# Patient Record
Sex: MALE | Race: ASIAN | NOT HISPANIC OR LATINO | URBAN - METROPOLITAN AREA
[De-identification: names, ages, dates, MRNs, and addresses within clinical notes are randomized per-mention and may not be internally consistent; named-entity substitution may affect disease eponyms.]

---

## 2019-01-01 ENCOUNTER — INPATIENT (INPATIENT)
Facility: HOSPITAL | Age: 74
LOS: 0 days | DRG: 284 | End: 2019-12-03
Attending: INTERNAL MEDICINE | Admitting: INTERNAL MEDICINE
Payer: MEDICARE

## 2019-01-01 VITALS
TEMPERATURE: 98 F | HEART RATE: 96 BPM | SYSTOLIC BLOOD PRESSURE: 176 MMHG | DIASTOLIC BLOOD PRESSURE: 103 MMHG | RESPIRATION RATE: 25 BRPM | OXYGEN SATURATION: 94 %

## 2019-01-01 VITALS — HEART RATE: 142 BPM

## 2019-01-01 DIAGNOSIS — I21.19 ST ELEVATION (STEMI) MYOCARDIAL INFARCTION INVOLVING OTHER CORONARY ARTERY OF INFERIOR WALL: ICD-10-CM

## 2019-01-01 DIAGNOSIS — E11.9 TYPE 2 DIABETES MELLITUS WITHOUT COMPLICATIONS: ICD-10-CM

## 2019-01-01 DIAGNOSIS — I25.10 ATHEROSCLEROTIC HEART DISEASE OF NATIVE CORONARY ARTERY WITHOUT ANGINA PECTORIS: ICD-10-CM

## 2019-01-01 DIAGNOSIS — Z91.89 OTHER SPECIFIED PERSONAL RISK FACTORS, NOT ELSEWHERE CLASSIFIED: ICD-10-CM

## 2019-01-01 DIAGNOSIS — I10 ESSENTIAL (PRIMARY) HYPERTENSION: ICD-10-CM

## 2019-01-01 DIAGNOSIS — N40.1 BENIGN PROSTATIC HYPERPLASIA WITH LOWER URINARY TRACT SYMPTOMS: ICD-10-CM

## 2019-01-01 DIAGNOSIS — R63.8 OTHER SYMPTOMS AND SIGNS CONCERNING FOOD AND FLUID INTAKE: ICD-10-CM

## 2019-01-01 LAB
ALBUMIN SERPL ELPH-MCNC: 4 G/DL — SIGNIFICANT CHANGE UP (ref 3.3–5)
ALP SERPL-CCNC: 89 U/L — SIGNIFICANT CHANGE UP (ref 40–120)
ALT FLD-CCNC: 37 U/L — SIGNIFICANT CHANGE UP (ref 10–45)
ANION GAP SERPL CALC-SCNC: 16 MMOL/L — SIGNIFICANT CHANGE UP (ref 5–17)
APTT BLD: 163.4 SEC — CRITICAL HIGH (ref 27.5–36.3)
AST SERPL-CCNC: 98 U/L — HIGH (ref 10–40)
BILIRUB SERPL-MCNC: 0.9 MG/DL — SIGNIFICANT CHANGE UP (ref 0.2–1.2)
BUN SERPL-MCNC: 28 MG/DL — HIGH (ref 7–23)
CALCIUM SERPL-MCNC: 10.2 MG/DL — SIGNIFICANT CHANGE UP (ref 8.4–10.5)
CHLORIDE SERPL-SCNC: 96 MMOL/L — SIGNIFICANT CHANGE UP (ref 96–108)
CHOLEST SERPL-MCNC: 224 MG/DL — HIGH (ref 10–199)
CK MB CFR SERPL CALC: 55.3 NG/ML — HIGH (ref 0–6.7)
CK SERPL-CCNC: 1039 U/L — HIGH (ref 30–200)
CO2 SERPL-SCNC: 20 MMOL/L — LOW (ref 22–31)
CREAT SERPL-MCNC: 1.48 MG/DL — HIGH (ref 0.5–1.3)
GAS PNL BLDV: SIGNIFICANT CHANGE UP
GLUCOSE SERPL-MCNC: 370 MG/DL — HIGH (ref 70–99)
HCT VFR BLD CALC: 46.9 % — SIGNIFICANT CHANGE UP (ref 39–50)
HDLC SERPL-MCNC: 57 MG/DL — SIGNIFICANT CHANGE UP
HGB BLD-MCNC: 15 G/DL — SIGNIFICANT CHANGE UP (ref 13–17)
INR BLD: 1.55 — HIGH (ref 0.88–1.16)
LACTATE SERPL-SCNC: 3.2 MMOL/L — HIGH (ref 0.5–2)
LIPID PNL WITH DIRECT LDL SERPL: 146 MG/DL — HIGH
MCHC RBC-ENTMCNC: 27.5 PG — SIGNIFICANT CHANGE UP (ref 27–34)
MCHC RBC-ENTMCNC: 32 GM/DL — SIGNIFICANT CHANGE UP (ref 32–36)
MCV RBC AUTO: 85.9 FL — SIGNIFICANT CHANGE UP (ref 80–100)
NRBC # BLD: 0 /100 WBCS — SIGNIFICANT CHANGE UP (ref 0–0)
PLATELET # BLD AUTO: 244 K/UL — SIGNIFICANT CHANGE UP (ref 150–400)
POTASSIUM SERPL-MCNC: 5.4 MMOL/L — HIGH (ref 3.5–5.3)
POTASSIUM SERPL-SCNC: 5.4 MMOL/L — HIGH (ref 3.5–5.3)
PROT SERPL-MCNC: 7.7 G/DL — SIGNIFICANT CHANGE UP (ref 6–8.3)
PROTHROM AB SERPL-ACNC: 17.8 SEC — HIGH (ref 10–12.9)
RBC # BLD: 5.46 M/UL — SIGNIFICANT CHANGE UP (ref 4.2–5.8)
RBC # FLD: 14.4 % — SIGNIFICANT CHANGE UP (ref 10.3–14.5)
SODIUM SERPL-SCNC: 132 MMOL/L — LOW (ref 135–145)
TOTAL CHOLESTEROL/HDL RATIO MEASUREMENT: 3.9 RATIO — SIGNIFICANT CHANGE UP (ref 3.4–9.6)
TRIGL SERPL-MCNC: 106 MG/DL — SIGNIFICANT CHANGE UP (ref 10–149)
TROPONIN T SERPL-MCNC: 3.18 NG/ML — CRITICAL HIGH (ref 0–0.01)
TSH SERPL-MCNC: 1.62 UIU/ML — SIGNIFICANT CHANGE UP (ref 0.35–4.94)
WBC # BLD: 13.29 K/UL — HIGH (ref 3.8–10.5)
WBC # FLD AUTO: 13.29 K/UL — HIGH (ref 3.8–10.5)

## 2019-01-01 PROCEDURE — 84443 ASSAY THYROID STIM HORMONE: CPT

## 2019-01-01 PROCEDURE — 71045 X-RAY EXAM CHEST 1 VIEW: CPT | Mod: 26

## 2019-01-01 PROCEDURE — 93307 TTE W/O DOPPLER COMPLETE: CPT | Mod: 26

## 2019-01-01 PROCEDURE — 84132 ASSAY OF SERUM POTASSIUM: CPT

## 2019-01-01 PROCEDURE — 82550 ASSAY OF CK (CPK): CPT

## 2019-01-01 PROCEDURE — 80053 COMPREHEN METABOLIC PANEL: CPT

## 2019-01-01 PROCEDURE — 82553 CREATINE MB FRACTION: CPT

## 2019-01-01 PROCEDURE — 84295 ASSAY OF SERUM SODIUM: CPT

## 2019-01-01 PROCEDURE — 80061 LIPID PANEL: CPT

## 2019-01-01 PROCEDURE — 86900 BLOOD TYPING SEROLOGIC ABO: CPT

## 2019-01-01 PROCEDURE — 71045 X-RAY EXAM CHEST 1 VIEW: CPT

## 2019-01-01 PROCEDURE — 84484 ASSAY OF TROPONIN QUANT: CPT

## 2019-01-01 PROCEDURE — 82962 GLUCOSE BLOOD TEST: CPT

## 2019-01-01 PROCEDURE — 83735 ASSAY OF MAGNESIUM: CPT

## 2019-01-01 PROCEDURE — 93306 TTE W/DOPPLER COMPLETE: CPT

## 2019-01-01 PROCEDURE — 85730 THROMBOPLASTIN TIME PARTIAL: CPT

## 2019-01-01 PROCEDURE — 85610 PROTHROMBIN TIME: CPT

## 2019-01-01 PROCEDURE — 86850 RBC ANTIBODY SCREEN: CPT

## 2019-01-01 PROCEDURE — 83605 ASSAY OF LACTIC ACID: CPT

## 2019-01-01 PROCEDURE — 85027 COMPLETE CBC AUTOMATED: CPT

## 2019-01-01 PROCEDURE — 82803 BLOOD GASES ANY COMBINATION: CPT

## 2019-01-01 PROCEDURE — 82330 ASSAY OF CALCIUM: CPT

## 2019-01-01 PROCEDURE — 86901 BLOOD TYPING SEROLOGIC RH(D): CPT

## 2019-01-01 PROCEDURE — 36415 COLL VENOUS BLD VENIPUNCTURE: CPT

## 2019-01-01 RX ORDER — ASPIRIN/CALCIUM CARB/MAGNESIUM 324 MG
81 TABLET ORAL DAILY
Refills: 0 | Status: CANCELLED | OUTPATIENT
Start: 2019-12-04 | End: 2019-01-01

## 2019-01-01 RX ORDER — PHENYLEPHRINE HYDROCHLORIDE 10 MG/ML
0.1 INJECTION INTRAVENOUS
Qty: 40 | Refills: 0 | Status: DISCONTINUED | OUTPATIENT
Start: 2019-01-01 | End: 2019-01-01

## 2019-01-01 RX ORDER — INSULIN LISPRO 100/ML
VIAL (ML) SUBCUTANEOUS
Refills: 0 | Status: DISCONTINUED | OUTPATIENT
Start: 2019-01-01 | End: 2019-01-01

## 2019-01-01 RX ORDER — EPINEPHRINE 0.3 MG/.3ML
0.1 INJECTION INTRAMUSCULAR; SUBCUTANEOUS
Qty: 4 | Refills: 0 | Status: DISCONTINUED | OUTPATIENT
Start: 2019-01-01 | End: 2019-01-01

## 2019-01-01 RX ORDER — NOREPINEPHRINE BITARTRATE/D5W 8 MG/250ML
0.05 PLASTIC BAG, INJECTION (ML) INTRAVENOUS
Qty: 8 | Refills: 0 | Status: DISCONTINUED | OUTPATIENT
Start: 2019-01-01 | End: 2019-01-01

## 2019-01-01 RX ORDER — TAMSULOSIN HYDROCHLORIDE 0.4 MG/1
0.4 CAPSULE ORAL DAILY
Refills: 0 | Status: DISCONTINUED | OUTPATIENT
Start: 2019-01-01 | End: 2019-01-01

## 2019-01-01 RX ORDER — DEXTROSE 50 % IN WATER 50 %
25 SYRINGE (ML) INTRAVENOUS ONCE
Refills: 0 | Status: DISCONTINUED | OUTPATIENT
Start: 2019-01-01 | End: 2019-01-01

## 2019-01-01 RX ORDER — SODIUM CHLORIDE 9 MG/ML
1000 INJECTION, SOLUTION INTRAVENOUS
Refills: 0 | Status: DISCONTINUED | OUTPATIENT
Start: 2019-01-01 | End: 2019-01-01

## 2019-01-01 RX ORDER — METOPROLOL TARTRATE 50 MG
1 TABLET ORAL
Qty: 0 | Refills: 0 | DISCHARGE

## 2019-01-01 RX ORDER — TICAGRELOR 90 MG/1
90 TABLET ORAL EVERY 12 HOURS
Refills: 0 | Status: CANCELLED | OUTPATIENT
Start: 2019-12-04 | End: 2019-01-01

## 2019-01-01 RX ORDER — INSULIN HUMAN 100 [IU]/ML
10 INJECTION, SOLUTION SUBCUTANEOUS ONCE
Refills: 0 | Status: COMPLETED | OUTPATIENT
Start: 2019-01-01 | End: 2019-01-01

## 2019-01-01 RX ORDER — INSULIN LISPRO 100/ML
VIAL (ML) SUBCUTANEOUS AT BEDTIME
Refills: 0 | Status: DISCONTINUED | OUTPATIENT
Start: 2019-01-01 | End: 2019-01-01

## 2019-01-01 RX ORDER — PANTOPRAZOLE SODIUM 20 MG/1
40 TABLET, DELAYED RELEASE ORAL
Refills: 0 | Status: DISCONTINUED | OUTPATIENT
Start: 2019-01-01 | End: 2019-01-01

## 2019-01-01 RX ORDER — INSULIN GLARGINE 100 [IU]/ML
20 INJECTION, SOLUTION SUBCUTANEOUS AT BEDTIME
Refills: 0 | Status: DISCONTINUED | OUTPATIENT
Start: 2019-01-01 | End: 2019-01-01

## 2019-01-01 RX ORDER — CHLORHEXIDINE GLUCONATE 213 G/1000ML
1 SOLUTION TOPICAL
Refills: 0 | Status: DISCONTINUED | OUTPATIENT
Start: 2019-01-01 | End: 2019-01-01

## 2019-01-01 RX ORDER — EPTIFIBATIDE 2 MG/ML
2 INJECTION, SOLUTION INTRAVENOUS
Qty: 75 | Refills: 0 | Status: DISCONTINUED | OUTPATIENT
Start: 2019-01-01 | End: 2019-01-01

## 2019-01-01 RX ORDER — DEXTROSE 50 % IN WATER 50 %
25 SYRINGE (ML) INTRAVENOUS ONCE
Refills: 0 | Status: COMPLETED | OUTPATIENT
Start: 2019-01-01 | End: 2019-01-01

## 2019-01-01 RX ORDER — DEXTROSE 50 % IN WATER 50 %
50 SYRINGE (ML) INTRAVENOUS ONCE
Refills: 0 | Status: DISCONTINUED | OUTPATIENT
Start: 2019-01-01 | End: 2019-01-01

## 2019-01-01 RX ORDER — MORPHINE SULFATE 50 MG/1
2 CAPSULE, EXTENDED RELEASE ORAL ONCE
Refills: 0 | Status: DISCONTINUED | OUTPATIENT
Start: 2019-01-01 | End: 2019-01-01

## 2019-01-01 RX ORDER — ONDANSETRON 8 MG/1
4 TABLET, FILM COATED ORAL
Refills: 0 | Status: DISCONTINUED | OUTPATIENT
Start: 2019-01-01 | End: 2019-01-01

## 2019-01-01 RX ORDER — TAMSULOSIN HYDROCHLORIDE 0.4 MG/1
1 CAPSULE ORAL
Qty: 0 | Refills: 0 | DISCHARGE

## 2019-01-01 RX ORDER — INSULIN LISPRO 100 [IU]/ML
20 INJECTION, SUSPENSION SUBCUTANEOUS
Qty: 0 | Refills: 0 | DISCHARGE

## 2019-01-01 RX ORDER — INSULIN GLARGINE 100 [IU]/ML
15 INJECTION, SOLUTION SUBCUTANEOUS ONCE
Refills: 0 | Status: COMPLETED | OUTPATIENT
Start: 2019-01-01 | End: 2019-01-01

## 2019-01-01 RX ORDER — MIDAZOLAM HYDROCHLORIDE 1 MG/ML
2 INJECTION, SOLUTION INTRAMUSCULAR; INTRAVENOUS ONCE
Refills: 0 | Status: DISCONTINUED | OUTPATIENT
Start: 2019-01-01 | End: 2019-01-01

## 2019-01-01 RX ORDER — ATORVASTATIN CALCIUM 80 MG/1
80 TABLET, FILM COATED ORAL ONCE
Refills: 0 | Status: COMPLETED | OUTPATIENT
Start: 2019-01-01 | End: 2019-01-01

## 2019-01-01 RX ORDER — DEXTROSE 50 % IN WATER 50 %
12.5 SYRINGE (ML) INTRAVENOUS ONCE
Refills: 0 | Status: DISCONTINUED | OUTPATIENT
Start: 2019-01-01 | End: 2019-01-01

## 2019-01-01 RX ORDER — GLUCAGON INJECTION, SOLUTION 0.5 MG/.1ML
1 INJECTION, SOLUTION SUBCUTANEOUS ONCE
Refills: 0 | Status: DISCONTINUED | OUTPATIENT
Start: 2019-01-01 | End: 2019-01-01

## 2019-01-01 RX ORDER — ONDANSETRON 8 MG/1
4 TABLET, FILM COATED ORAL ONCE
Refills: 0 | Status: DISCONTINUED | OUTPATIENT
Start: 2019-01-01 | End: 2019-01-01

## 2019-01-01 RX ORDER — ISOSORBIDE MONONITRATE 60 MG/1
1 TABLET, EXTENDED RELEASE ORAL
Qty: 0 | Refills: 0 | DISCHARGE

## 2019-01-01 RX ORDER — DOBUTAMINE HCL 250MG/20ML
2.5 VIAL (ML) INTRAVENOUS
Qty: 500 | Refills: 0 | Status: DISCONTINUED | OUTPATIENT
Start: 2019-01-01 | End: 2019-01-01

## 2019-01-01 RX ORDER — ATORVASTATIN CALCIUM 80 MG/1
80 TABLET, FILM COATED ORAL AT BEDTIME
Refills: 0 | Status: CANCELLED | OUTPATIENT
Start: 2019-12-04 | End: 2019-01-01

## 2019-01-01 RX ORDER — SODIUM CHLORIDE 9 MG/ML
1000 INJECTION INTRAMUSCULAR; INTRAVENOUS; SUBCUTANEOUS
Refills: 0 | Status: DISCONTINUED | OUTPATIENT
Start: 2019-01-01 | End: 2019-01-01

## 2019-01-01 RX ORDER — INSULIN LISPRO 100/ML
VIAL (ML) SUBCUTANEOUS ONCE
Refills: 0 | Status: COMPLETED | OUTPATIENT
Start: 2019-01-01 | End: 2019-01-01

## 2019-01-01 RX ORDER — ASPIRIN/CALCIUM CARB/MAGNESIUM 324 MG
1 TABLET ORAL
Qty: 0 | Refills: 0 | DISCHARGE

## 2019-01-01 RX ORDER — DEXTROSE 50 % IN WATER 50 %
15 SYRINGE (ML) INTRAVENOUS ONCE
Refills: 0 | Status: DISCONTINUED | OUTPATIENT
Start: 2019-01-01 | End: 2019-01-01

## 2019-01-01 RX ADMIN — MORPHINE SULFATE 2 MILLIGRAM(S): 50 CAPSULE, EXTENDED RELEASE ORAL at 02:37

## 2019-01-01 RX ADMIN — INSULIN HUMAN 10 UNIT(S): 100 INJECTION, SOLUTION SUBCUTANEOUS at 00:27

## 2019-01-01 RX ADMIN — Medication 8: at 01:41

## 2019-01-01 RX ADMIN — Medication 25 MILLILITER(S): at 00:26

## 2019-12-02 NOTE — H&P ADULT - NSHPREVIEWOFSYSTEMS_GEN_ALL_CORE
REVIEW OF SYSTEMS:    CONSTITUTIONAL: No weakness, fevers or chills  EYES/ENT: No visual changes;  No vertigo or throat pain   NECK: No pain or stiffness  RESPIRATORY: No cough, wheezing, hemoptysis; No shortness of breath  CARDIOVASCULAR: No chest pain or palpitations  GASTROINTESTINAL: No abdominal or epigastric pain. No nausea, vomiting, or hematemesis; No diarrhea or constipation. No melena or hematochezia.  GENITOURINARY: No dysuria, + frequency, no hematuria  NEUROLOGICAL: No numbness or weakness  SKIN: No itching, rashes

## 2019-12-02 NOTE — H&P ADULT - NSICDXPASTMEDICALHX_GEN_ALL_CORE_FT
PAST MEDICAL HISTORY:  Arteriosclerosis of coronary artery in patient with history of myocardial infarction     BPH (benign prostatic hyperplasia)     HTN (hypertension)     Type II diabetes mellitus

## 2019-12-02 NOTE — H&P ADULT - PROBLEM SELECTOR PLAN 1
Hx of CAD s/p LAUREN to OM1 & LCx in 2010  on aspirin 81 & isosorbide 30. EKG in ED with TRACY in II, III, aVF w/bradycardia &1st degree AV block.  mg, Brilinta 180 mg, Heparin 5000 administered. In the [facility] Cath Lab he was found to have 100% stenosis in PLS branch and 90% stenosis of the ostial RPDA. Ballooning of both lesions was performed. Stenosis was severely calcified and the lesions could not be stented. He had a distal RCA dissection during the procedure. He then went into cardiogenic shock and was started on levophed. Levophed was downtitrated, and he was started on Integrin gtt and transferred to [facility].   -Continue ASA/Brilinta Hx of CAD s/p LAUREN to OM1 & LCx in 2010  on aspirin 81 & isosorbide 30. EKG in ED with TRACY in II, III, aVF w/bradycardia &1st degree AV block.  mg, Brilinta 180 mg, Heparin 5000 administered. In the [facility] Cath Lab he was found to have 100% stenosis in PLS branch and 90% stenosis of the ostial RPDA. Ballooning of both lesions was performed. Stenosis was severely calcified and the lesions could not be stented. He had a distal RCA dissection during the procedure. He then went into cardiogenic shock and was started on levophed. Levophed was downtitrated, and he was started on Integrin gtt and transferred to [facility].   -Continue ASA/Brilinta/Atorvastatin

## 2019-12-02 NOTE — H&P ADULT - NSHPPHYSICALEXAM_GEN_ALL_CORE
VITAL SIGNS:  T(F): 97.6 (12-03-19 @ 01:00), Max: 97.7 (12-02-19 @ 23:12)  HR: 96 (12-03-19 @ 00:29) (90 - 96)  BP: 157/99 (12-03-19 @ 01:01) (157/99 - 181/103)  BP(mean): 121 (12-03-19 @ 01:01) (121 - 139)  RR: 30 (12-03-19 @ 01:01) (23 - 48)  SpO2: 97% (12-03-19 @ 01:01) (94% - 98%)    PHYSICAL EXAM:    Constitutional: WDWN resting comfortably in bed; NAD  HEENT: NC/AT, PERRL, EOMI, anicteric sclera, no nasal discharge; uvula midline, no oropharyngeal erythema or exudates; dry mucous membranes  Neck: supple; no JVD or thyromegaly  Respiratory: Crackles in LL b/l, no wheezes or rhonchi, no retractions  Cardiac: +S1/S2; RRR; no M/R/G; PMI non-displaced  Gastrointestinal: soft, NT, suprapubic fullness, no rebound or guarding; +BSx4  Genitourinary: normal external genitalia  Back: spine midline, no bony tenderness or step-offs; no CVAT B/L  Extremities: WWP, no clubbing or cyanosis; no peripheral edema, darkening of skin of distal lower extremity b/l, palpable hematoma of R wrist.   Musculoskeletal: NROM x4; no joint swelling, tenderness or erythema  Vascular: 2+ radial, DP pulses B/L except for 1+ radial pulse on R hand (radial seal cuff in place)  Dermatologic: skin warm, dry and intact; no rashes, wounds, or scars  Neurologic: AAOx3; CNII-XII grossly intact; no focal deficits  Psychiatric: affect and characteristics of appearance, verbalizations, behaviors are appropriate

## 2019-12-02 NOTE — H&P ADULT - PROBLEM SELECTOR PLAN 6
Fluids: none at the moment  Electrolytes: replete as necessary, K>4, Mg>2  Nutrition: consistent carb, DASH  Bowel Regimen: senna  DVT ppx: HSQ  Code: Full  Disposition: CCU

## 2019-12-02 NOTE — H&P ADULT - HISTORY OF PRESENT ILLNESS
74 year old male with PMHx of HTN, DM, CAD s/p LAUREN to OM1, LCx 2010 presenting to [facility] as a transfer from [facility] after being found to have an inferior wall STEMI. He had sudden restrosternal chest pain starting at 2 PM prompting his visit to the [place] ED. He has not been feeling well for 2 weeks, with increasing KIM. Per his son Felix (at bedside) he has been compliant with his medications at home.    ED/Cath Lab Course at [facility]: Chest pain, L arm pain, increased KIM. Found to have an inferior wall STEMI (TRACY in II, III, aVF) w/bradycardia &1st degree AV block. Troponin I 29.6, BNP 4,230, Gluc 392, BUN 30, Cr 1.4, Na 131, K 6.1, Cl 97, HCO3 27, Ca 8.9, ALT 48, , Alk Phos 74. Guaiac negative.  mg, Brilinta 180 mg, Heparin 5000 administered.      He had PCI at [place] revealing 90% occlusion OM1, 74 year old male with PMHx of HTN (on metoprolol & hydrochlorothiazide), IDDM 2, CAD s/p LAUREN to OM1 & LCx in 2010  on aspirin 81 & isosorbide 30, BPH on Tamulsosin 0.4, presenting to [facility] as a transfer from [facility] after being found to have an inferior wall STEMI. He had sudden restrosternal chest pain starting at 2 PM prompting his visit to the [place] ED. He has not been feeling well for 2 weeks, with increasing KIM and non-productive cough. Per his son Felix (at bedside) he has been compliant with his medications at home.     ED/Cath Lab Course at [facility]: Chest pain, L arm pain, increased KIM. Found to have an inferior wall STEMI (TRACY in II, III, aVF) w/bradycardia &1st degree AV block. Troponin I 29.6, BNP 4,230, Gluc 392, BUN 30, Cr 1.4, Na 131, K 6.1, Cl 97, HCO3 27, Ca 8.9, ALT 48, , Alk Phos 74. Guaiac negative.  mg, Brilinta 180 mg, Heparin 5000 administered. In the [facility] Cath Lab he was found to have 100% stenosis in PLS branch and 90% stenosis of the ostial RPDA. Ballooning of both lesions was performed. Stenosis was severely calcified and the lesions could not be stented. He had a distal RCA dissection during the procedure. He then went into cardiogenic shock and was started on levophed. He was also given calcium gluconate for hyperkalemia. He was weaned off the levophed and started on an Integrin gtt. He was deemed stable for transport and sent to [facility] for possible rotational arterectomy and stent.    Upon arrival to [facility]: T 97.7, HR 96, /103, RR 25, SpO2 94% on 2LNC, he was in no acute distress. He denied chest pain, palpitations, nausea, abdominal pain.

## 2019-12-02 NOTE — H&P ADULT - ASSESSMENT
73 y/o M w/PMHx CAD s/p LAUREN to LCx & OM1, DM, HTN, BPH who presented to [facility] w/inferior wall STEMI, 73 y/o M w/PMHx CAD s/p LAUREN to LCx & OM1, DM, HTN, BPH who presented to [facility] w/inferior wall STEMI. PCI w/ 100% stenosis in PLS branch and 90% stenosis of the ostial RPDA. Ballooning of both lesions was performed. Stenosis was severely calcified and the lesions could not be stented. He had a distal RCA dissection during the procedure. He then went into cardiogenic shock and was started on levophed. He was also given calcium gluconate for hyperkalemia. He was weaned off the levophed and started on an Integrin gtt. He was deemed stable for transport and sent to [facility] for possible rotational arterectomy and stent.

## 2019-12-02 NOTE — H&P ADULT - PROBLEM SELECTOR PLAN 2
On metoprolol tartrate 50 mg BID & HCTZ 25 QD @ home   -Holding antihypertensives given recent cardiogenic shock

## 2019-12-02 NOTE — H&P ADULT - PROBLEM SELECTOR PLAN 7
1) PCP Contacted on Admission: (Y/N) --> Name & Phone #: Dr. Andrew (PCP) [phone], Dr. Jean (cardiologist) [phone]  2) Date of Contact with PCP: TBD  3) PCP Contacted at Discharge: TBD  4) Summary of Handoff Given to PCP: TBD   5) Post-Discharge Appointment Date: TBD

## 2019-12-03 NOTE — DISCHARGE NOTE FOR THE EXPIRED PATIENT - SECONDARY DIAGNOSIS.
Type II diabetes mellitus Essential hypertension Arteriosclerosis of coronary artery in patient with history of myocardial infarction

## 2019-12-03 NOTE — DISCHARGE NOTE FOR THE EXPIRED PATIENT - HOSPITAL COURSE
Patient is a 73 y/o M w/known hx of CAD w/known lesions in OM1, D1, who was transferred from [facility] from [place] where he presented with CP, found to have inferior wall STEMI w/Trop I of 29.6 & taken urgently to cath lab. RCA branches noted to be heavily calcified with 100% occlusion of distal RCA and 90% of the RPLDA. Difficulty with ballooning during procedure with SNEHA 2 flow in RPL. Procedure complicated by cardiogenic shock, requiring Levophex and IV lasix in setting of moderate MR. Patient also started on integrin gtt and transferred to [facility] for possible rotational atherectomy in AM.    Upon arrival to CCU, initially asymptomatic & hypertensive to 170s. Not treated with any intervention. 3 peripheral IV lines established. Lab work revealed hyperkalemia, downtrending from [place] 6.1, for which the patient received insulin and 1/2 amp D50.     Around 1:45 AM patient with sudden onset substernal chest pain, diaphoresis, agitation, and discomfort. Patient assessed immediately by CCU team: Resident, Intern, and Fellow went to bedside and found patient to be in acute distress with 9/10 chest pain, worse than the pain of his initial MI. Interventional Fellow made aware and was physically present within 10 minutes of event. Patient became progressively hemodynamically compromised with SBP down to 70s & becoming lethargic, requiring manual ventilation & pressor support (started on levophed and dobutamine), subsequently bradying down, no pulse felt CODE blue called. Patient reveiced multiple rounds of atropine, bicarb, Ca gluconate & insulin also given. High suspicion for free wall rupture and cardiac tamponade. Interventional fellow attempted emergent bedside pericardiocentesis without resolution of hypotension. However, ROSC achieved at 2:36 AM Patient started on epinephrine drip. During code family made aware. Patient pronounced at 4:27 am 12/3/2019 with family present at bedside. CCU attending Dr. Purdy made aware.

## 2019-12-03 NOTE — CHART NOTE - NSCHARTNOTEFT_GEN_A_CORE
Patient began complaining of chest pain around 1:30 AM. Resident, Intern, and Fellow went to bedside and found patient to be in acute distress with 9/10 chest pain, worse than the pain of his initial MI.    Vitals revealed a drop in SBP from 170s to 150s to 90s over the course of approximately  30 minutes. Given the drop in blood pressure, morphine was not given. The patient was initially noted to be AAOx3, but soon became disoriented and unresponsive. ___________. His son, Felix, was notified of the need for emergent intubation. The patient was intubated and sedated. VBG revealed: Patient began complaining of chest pain around 1:30 AM. Resident, Intern, and Fellow went to bedside and found patient to be in acute distress with 9/10 chest pain, worse than the pain of his initial MI.    Vitals revealed a drop in SBP from 170s to 150s to 90s over the course of approximately  30 minutes. Given the drop in blood pressure, morphine was not given. The patient was initially noted to be AAOx3, but soon became disoriented and unresponsive. ___________. His son, Felix, was notified of the need for emergent intubation. The patient was intubated and sedated. VBG revealed: pH 7.13, pCO2 34, pO2 321, Patient is a 75 y/o M w/known hx of CAD w/known lesions in OM1, D1, who was transferred from [facility] from [place] where he presented with CP, found to have inferior wall STEMI w/Trop I of 29.6 & taken urgently to cath lab. RCA branches noted to be heavily calcified with 100% occlusion of distal RCA and 90% of the RPLDA. Difficulty with ballooning during procedure with SNEHA 2 flow in RPL. Procedure complicated by cardiogenic shock, requiring Levophex and IV lasix in setting of moderate MR. Patient also started on integrin gtt and transferred to [facility] for possible rotational atherectomy in AM.    Upon arrival to CCU, initially asymptomatic & hypertensive to 170s. Not treated with any intervention. 3 peripheral IV lines established. Lab work revealed hyperkalemia, downtrending from [place] 6.1, for which the patient received insulin and 1/2 amp D50.     Around 1:45 AM patient with sudden onset substernal chest pain, diaphoresis, agitation, and discomfort. Patient assessed immediately by CCU team: Resident, Intern, and Fellow went to bedside and found patient to be in acute distress with 9/10 chest pain, worse than the pain of his initial MI. Interventional Fellow made aware and was physically present within 10 minutes of event. Patient became progressively hemodynamically compromised with SBP down to 70s & becoming lethargic, requiring manual ventilation & pressor support (started on levophed and dobutamine), subsequently bradying down, no pulse felt CODE blue called. Patient reveiced multiple rounds of atropine, bicarb, Ca gluconate & insulin also given. High suspicion for free wall rupture and cardiac tamponade. Interventional fellow attempted emergent bedside pericardiocentesis without resolution. Patient started on epinephrine drip. During code family made aware. Patient pronounced at 4:27 am 12/3/2019 with family present at bedside. CCU attending Dr. Purdy made aware.       Patient bradying down, requiring multiple rounds of atropine.         CODE blue called. Patient received Patient is a 75 y/o M w/known hx of CAD w/known lesions in OM1, D1, who was transferred from [facility] from [place] where he presented with CP, found to have inferior wall STEMI w/Trop I of 29.6 & taken urgently to cath lab. RCA branches noted to be heavily calcified with 100% occlusion of distal RCA and 90% of the RPLDA. Difficulty with ballooning during procedure with SNEHA 2 flow in RPL. Procedure complicated by cardiogenic shock, requiring Levophex and IV lasix in setting of moderate MR. Patient also started on integrin gtt and transferred to [facility] for possible rotational atherectomy in AM.    Upon arrival to CCU, initially asymptomatic & hypertensive to 170s. Not treated with any intervention. 3 peripheral IV lines established. Lab work revealed hyperkalemia, downtrending from [place] 6.1, for which the patient received insulin and 1/2 amp D50.     Around 1:45 AM patient with sudden onset substernal chest pain, diaphoresis, agitation, and discomfort. Patient assessed immediately by CCU team: Resident, Intern, and Fellow went to bedside and found patient to be in acute distress with 9/10 chest pain, worse than the pain of his initial MI. Interventional Fellow made aware and was physically present within 10 minutes of event. Patient became progressively hemodynamically compromised with SBP down to 70s & becoming lethargic, requiring manual ventilation & pressor support (started on levophed and dobutamine), subsequently bradying down, no pulse felt CODE blue called. Patient reveiced multiple rounds of atropine, bicarb, Ca gluconate & insulin also given. High suspicion for free wall rupture and cardiac tamponade. Interventional fellow attempted emergent bedside pericardiocentesis without resolution of hypotension. However, ROSC achieved at 2:36 AM Patient started on epinephrine drip. During code family made aware. Patient pronounced at 4:27 am 12/3/2019 with family present at bedside. CCU attending Dr. Purdy made aware.

## 2019-12-08 DIAGNOSIS — E11.9 TYPE 2 DIABETES MELLITUS WITHOUT COMPLICATIONS: ICD-10-CM

## 2019-12-08 DIAGNOSIS — R63.8 OTHER SYMPTOMS AND SIGNS CONCERNING FOOD AND FLUID INTAKE: ICD-10-CM

## 2019-12-08 DIAGNOSIS — Z79.4 LONG TERM (CURRENT) USE OF INSULIN: ICD-10-CM

## 2019-12-08 DIAGNOSIS — E87.5 HYPERKALEMIA: ICD-10-CM

## 2019-12-08 DIAGNOSIS — R07.9 CHEST PAIN, UNSPECIFIED: ICD-10-CM

## 2019-12-08 DIAGNOSIS — I31.4 CARDIAC TAMPONADE: ICD-10-CM

## 2019-12-08 DIAGNOSIS — Z95.5 PRESENCE OF CORONARY ANGIOPLASTY IMPLANT AND GRAFT: ICD-10-CM

## 2019-12-08 DIAGNOSIS — I10 ESSENTIAL (PRIMARY) HYPERTENSION: ICD-10-CM

## 2019-12-08 DIAGNOSIS — Z79.82 LONG TERM (CURRENT) USE OF ASPIRIN: ICD-10-CM

## 2019-12-08 DIAGNOSIS — I25.2 OLD MYOCARDIAL INFARCTION: ICD-10-CM

## 2019-12-08 DIAGNOSIS — I21.4 NON-ST ELEVATION (NSTEMI) MYOCARDIAL INFARCTION: ICD-10-CM

## 2019-12-08 DIAGNOSIS — I23.3 RUPTURE OF CARDIAC WALL WITHOUT HEMOPERICARDIUM AS CURRENT COMPLICATION FOLLOWING ACUTE MYOCARDIAL INFARCTION: ICD-10-CM

## 2019-12-08 DIAGNOSIS — N40.0 BENIGN PROSTATIC HYPERPLASIA WITHOUT LOWER URINARY TRACT SYMPTOMS: ICD-10-CM

## 2019-12-08 DIAGNOSIS — I25.10 ATHEROSCLEROTIC HEART DISEASE OF NATIVE CORONARY ARTERY WITHOUT ANGINA PECTORIS: ICD-10-CM

## 2019-12-17 DIAGNOSIS — I46.9 CARDIAC ARREST, CAUSE UNSPECIFIED: ICD-10-CM

## 2019-12-17 DIAGNOSIS — I21.19 ST ELEVATION (STEMI) MYOCARDIAL INFARCTION INVOLVING OTHER CORONARY ARTERY OF INFERIOR WALL: ICD-10-CM
